# Patient Record
Sex: MALE | Race: BLACK OR AFRICAN AMERICAN | NOT HISPANIC OR LATINO | URBAN - METROPOLITAN AREA
[De-identification: names, ages, dates, MRNs, and addresses within clinical notes are randomized per-mention and may not be internally consistent; named-entity substitution may affect disease eponyms.]

---

## 2024-10-04 ENCOUNTER — OFFICE VISIT (OUTPATIENT)
Dept: INFECTIOUS DISEASES | Facility: CLINIC | Age: 64
End: 2024-10-04
Payer: COMMERCIAL

## 2024-10-04 ENCOUNTER — CLINICAL SUPPORT (OUTPATIENT)
Dept: INFECTIOUS DISEASES | Facility: CLINIC | Age: 64
End: 2024-10-04
Payer: COMMERCIAL

## 2024-10-04 VITALS
SYSTOLIC BLOOD PRESSURE: 166 MMHG | HEART RATE: 68 BPM | WEIGHT: 222.69 LBS | DIASTOLIC BLOOD PRESSURE: 88 MMHG | TEMPERATURE: 99 F

## 2024-10-04 DIAGNOSIS — Z71.84 COUNSELING ABOUT TRAVEL: Primary | ICD-10-CM

## 2024-10-04 PROCEDURE — 99999 PR PBB SHADOW E&M-NEW PATIENT-LVL III: CPT | Mod: PBBFAC,,, | Performed by: INTERNAL MEDICINE

## 2024-10-04 PROCEDURE — 99999 PR PBB SHADOW E&M-EST. PATIENT-LVL I: CPT | Mod: PBBFAC,,,

## 2024-10-04 RX ORDER — AMLODIPINE BESYLATE 10 MG/1
10 TABLET ORAL
COMMUNITY
Start: 2024-08-23

## 2024-10-04 RX ORDER — OLMESARTAN MEDOXOMIL AND HYDROCHLOROTHIAZIDE 40/12.5 40; 12.5 MG/1; MG/1
1 TABLET ORAL
COMMUNITY
Start: 2024-08-23

## 2024-10-04 RX ORDER — NEBIVOLOL HYDROCHLORIDE 10 MG/1
10 TABLET ORAL
COMMUNITY
Start: 2024-06-06

## 2024-10-04 RX ORDER — TESTOSTERONE CYPIONATE 200 MG/ML
INJECTION, SOLUTION INTRAMUSCULAR
COMMUNITY
Start: 2024-08-23

## 2024-10-04 RX ORDER — GUSELKUMAB 100 MG/ML
1 INJECTION SUBCUTANEOUS
COMMUNITY

## 2024-10-04 RX ORDER — ATOVAQUONE AND PROGUANIL HYDROCHLORIDE 250; 100 MG/1; MG/1
1 TABLET, FILM COATED ORAL DAILY
Qty: 19 TABLET | Refills: 0 | Status: SHIPPED | OUTPATIENT
Start: 2024-10-04 | End: 2024-10-23

## 2024-10-04 NOTE — PROGRESS NOTES
Subjective     Patient ID: Bill Torres is a 64 y.o. male.    Chief Complaint:Travel Consult      History of Present Illness  64M with history of HTN presents for travel counseling.    Patient will be going to [facility] 10/14/2024. He will be there for 10 days.    He is going on [employer] trip for job.   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    One day safari at the end of his trip.    Patient is on Tremfya.    He provided proof of the following vaccines:  2017 YF vaccine  2017 Avaxim  2017 Adacel-Polio    Immunization History   Administered Date(s) Administered    COVID-19, MRNA, LN-S, PF (Pfizer) (Gray Cap) 04/27/2022    COVID-19, MRNA, LN-S, PF (Pfizer) (Purple Cap) 12/18/2021    COVID-19, mRNA, LNP-S, bivalent booster, PF (PFIZER OMICRON) 02/25/2023    COVID-19, subunit, rS-nanoparticle, adjuvanted, PF, 5 mcg/0.5 mL(Novavax) 10/20/2023    Influenza - Quadrivalent - MDCK - PF 12/18/2021, 09/19/2023    Rsv, Bivalent, Rsvpref (Abrysvo) 10/20/2023    Zoster Recombinant 10/27/2023, 01/12/2024         Review of Systems   Constitutional: Negative for chills, decreased appetite, fever, malaise/fatigue, night sweats, weight gain and weight loss.   HENT:  Negative for congestion, ear pain, hearing loss, hoarse voice, sore throat and tinnitus.    Eyes:  Negative for blurred vision, redness and visual disturbance.   Cardiovascular:  Negative for chest pain, leg swelling and palpitations.   Respiratory:  Negative for cough, hemoptysis, shortness of breath, sputum production and wheezing.    Hematologic/Lymphatic: Negative for adenopathy. Does not bruise/bleed easily.   Skin:  Negative for dry skin, itching, rash and suspicious lesions.   Musculoskeletal:  Negative for back pain, joint pain, myalgias and neck pain.   Gastrointestinal:  Negative for abdominal pain, constipation, diarrhea, heartburn, nausea and vomiting.   Genitourinary:  Negative for dysuria, flank pain,  frequency, hematuria, hesitancy and urgency.   Neurological:  Negative for dizziness, headaches, numbness, paresthesias and weakness.   Psychiatric/Behavioral:  Negative for depression and memory loss. The patient does not have insomnia and is not nervous/anxious.       Objective   Physical Exam  Vitals reviewed.   Constitutional:       General: He is not in acute distress.     Appearance: He is well-developed. He is not diaphoretic.   HENT:      Head: Normocephalic and atraumatic.      Nose: Nose normal.   Eyes:      Conjunctiva/sclera: Conjunctivae normal.   Pulmonary:      Effort: Pulmonary effort is normal. No respiratory distress.   Abdominal:      General: Abdomen is flat. There is no distension.   Musculoskeletal:      Cervical back: Normal range of motion.      Right lower leg: No edema.      Left lower leg: No edema.   Skin:     General: Skin is warm and dry.      Findings: No erythema or rash.   Neurological:      Mental Status: He is alert.   Psychiatric:         Behavior: Behavior normal.            Assessment and Plan   64M with history of HTN on Tremfya, presents for travel counseling.    Plan:  The patient was provided with an extensive travel guidance packet which provides travel information specific to the patients itinerary.     The patient's medical history was reviewed and the patient was counseled on:  -Dietary precautions.  -Personal protective measures to prevent insect-borne diseases (e.g., malaria, dengue).  -Precautions to prevent exposure to rabies and seek treatment for possible exposures.  -Precautions against sun exposure.  -Precautions against development of DVT during flight.  -Personal and travel safety.    Vaccinations:  The patient's immunization history was reviewed and, based on the patient's itinerary, the following immunizations were ordered:    - Typhoid IM x1      Prescription was given for the following vaccines:  - COVID-19 Booster  - Influenza vaccine  - Hepatitis A  #2    The patient was encouraged to contact us about any problems that may develop after immunization and possible side effects were reviewed.    Yellow fever exemption given since he is on Tremfya.    Traveler's diarrhea:  The patient was instructed to purchase Imodium over the counter to take in case diarrhea (without blood or fever) develops.       Insect precautions:   The patient was also instructed to purchase insect repellent containing DEET or Picardin and apply according to repellent label instructions.      An anti-malarial agent was prescribed for malaria prophylaxis and possible side effects were reviewed.  - Atovaquone-proguanil 250-100 mg PO qdaily, start 2 days before expected exposure and end 7 days after last day of exposure.        The patient was instructed to contact us if problems develop after travel.     30 minutes of total time spent on the encounter, which includes face to face time and non-face to face time preparing to see the patient (eg, review of tests), obtaining and reviewing separately obtained history, documenting clinical information in the electronic or other health record, independently interpreting results (not separately reported) and communicating results to the patient/family/caregiver, and care coordination (not separately reported).     Queenie Avendano MD MPH  Infectious Diseases - [facility]

## 2024-10-04 NOTE — LETTER
October 4, 2024    Bill Torres  [address] [facility] [address]             [facility]st [facility]  [address]  [place] [address]  Phone: [phone]  Fax: [phone] Dear {MR/MRS/MS/:46360} Brian:    ***      If you have any questions or concerns, please don't hesitate to call.    Sincerely,        Queenie Avendano MD